# Patient Record
Sex: FEMALE | Race: WHITE | NOT HISPANIC OR LATINO | Employment: UNEMPLOYED | URBAN - METROPOLITAN AREA
[De-identification: names, ages, dates, MRNs, and addresses within clinical notes are randomized per-mention and may not be internally consistent; named-entity substitution may affect disease eponyms.]

---

## 2017-09-05 ENCOUNTER — LAB REQUISITION (OUTPATIENT)
Dept: LAB | Facility: HOSPITAL | Age: 47
End: 2017-09-05

## 2017-09-05 DIAGNOSIS — R13.10 DYSPHAGIA: ICD-10-CM

## 2017-09-05 PROCEDURE — 88305 TISSUE EXAM BY PATHOLOGIST: CPT | Performed by: INTERNAL MEDICINE

## 2017-09-06 LAB
CYTO UR: NORMAL
LAB AP CASE REPORT: NORMAL
LAB AP CLINICAL INFORMATION: NORMAL
Lab: NORMAL
PATH REPORT.FINAL DX SPEC: NORMAL
PATH REPORT.GROSS SPEC: NORMAL

## 2018-04-30 ENCOUNTER — TRANSCRIBE ORDERS (OUTPATIENT)
Dept: ADMINISTRATIVE | Facility: HOSPITAL | Age: 48
End: 2018-04-30

## 2018-04-30 DIAGNOSIS — E03.9 HYPOTHYROIDISM, ADULT: Primary | ICD-10-CM

## 2018-05-02 ENCOUNTER — HOSPITAL ENCOUNTER (OUTPATIENT)
Dept: ULTRASOUND IMAGING | Facility: HOSPITAL | Age: 48
Discharge: HOME | End: 2018-05-02
Admitting: FAMILY MEDICINE

## 2018-05-02 DIAGNOSIS — E03.9 HYPOTHYROIDISM, ADULT: ICD-10-CM

## 2018-05-02 PROCEDURE — 76536 US EXAM OF HEAD AND NECK: CPT

## 2019-03-05 ENCOUNTER — TELEPHONE (OUTPATIENT)
Dept: SURGERY | Facility: CLINIC | Age: 49
End: 2019-03-05

## 2019-03-07 ENCOUNTER — TRANSCRIBE ORDERS (OUTPATIENT)
Dept: MAMMOGRAPHY | Facility: HOSPITAL | Age: 49
End: 2019-03-07

## 2019-03-07 DIAGNOSIS — Z12.39 BREAST CANCER SCREENING: Primary | ICD-10-CM

## 2019-03-07 RX ORDER — BISACODYL 5 MG/1
5 TABLET, DELAYED RELEASE ORAL DAILY PRN
Qty: 4 TABLET | Refills: 0 | Status: SHIPPED | OUTPATIENT
Start: 2019-03-07 | End: 2019-03-25 | Stop reason: HOSPADM

## 2019-03-07 RX ORDER — POLYETHYLENE GLYCOL 3350 17 G/17G
238 POWDER, FOR SOLUTION ORAL ONCE
Qty: 14 PACKET | Refills: 0 | Status: SHIPPED | OUTPATIENT
Start: 2019-03-07 | End: 2019-03-07

## 2019-03-22 ENCOUNTER — PREP FOR SURGERY (OUTPATIENT)
Dept: OTHER | Facility: HOSPITAL | Age: 49
End: 2019-03-22

## 2019-03-22 DIAGNOSIS — Z12.11 COLON CANCER SCREENING: Primary | ICD-10-CM

## 2019-03-22 RX ORDER — SODIUM CHLORIDE, SODIUM LACTATE, POTASSIUM CHLORIDE, CALCIUM CHLORIDE 600; 310; 30; 20 MG/100ML; MG/100ML; MG/100ML; MG/100ML
50 INJECTION, SOLUTION INTRAVENOUS CONTINUOUS
Status: CANCELLED | OUTPATIENT
Start: 2019-03-25

## 2019-03-22 RX ORDER — AMOXICILLIN AND CLAVULANATE POTASSIUM 500; 125 MG/1; MG/1
1 TABLET, FILM COATED ORAL 3 TIMES DAILY
COMMUNITY
Start: 2019-03-22 | End: 2019-10-09

## 2019-03-22 RX ORDER — ACETAMINOPHEN,DIPHENHYDRAMINE HCL 500; 25 MG/1; MG/1
2 TABLET, FILM COATED ORAL NIGHTLY
COMMUNITY
End: 2020-04-08

## 2019-03-22 RX ORDER — SODIUM CHLORIDE 0.9 % (FLUSH) 0.9 %
3 SYRINGE (ML) INJECTION EVERY 12 HOURS SCHEDULED
Status: CANCELLED | OUTPATIENT
Start: 2019-03-25

## 2019-03-22 RX ORDER — VENLAFAXINE HYDROCHLORIDE 150 MG/1
150 CAPSULE, EXTENDED RELEASE ORAL DAILY
COMMUNITY
End: 2019-10-09

## 2019-03-22 RX ORDER — ALBUTEROL SULFATE 90 UG/1
2 AEROSOL, METERED RESPIRATORY (INHALATION) EVERY 4 HOURS PRN
COMMUNITY
End: 2019-11-19 | Stop reason: SDUPTHER

## 2019-03-22 RX ORDER — SODIUM CHLORIDE 0.9 % (FLUSH) 0.9 %
3-10 SYRINGE (ML) INJECTION AS NEEDED
Status: CANCELLED | OUTPATIENT
Start: 2019-03-25

## 2019-03-22 RX ORDER — CIPROFLOXACIN AND DEXAMETHASONE 3; 1 MG/ML; MG/ML
4 SUSPENSION/ DROPS AURICULAR (OTIC) 2 TIMES DAILY
COMMUNITY
Start: 2019-03-22 | End: 2019-10-09

## 2019-03-25 ENCOUNTER — TRANSCRIBE ORDERS (OUTPATIENT)
Dept: CARDIOLOGY | Facility: HOSPITAL | Age: 49
End: 2019-03-25

## 2019-03-25 ENCOUNTER — ANESTHESIA EVENT (OUTPATIENT)
Dept: GASTROENTEROLOGY | Facility: HOSPITAL | Age: 49
End: 2019-03-25

## 2019-03-25 ENCOUNTER — HOSPITAL ENCOUNTER (OUTPATIENT)
Facility: HOSPITAL | Age: 49
Setting detail: HOSPITAL OUTPATIENT SURGERY
Discharge: HOME | End: 2019-03-25
Attending: SURGERY | Admitting: SURGERY

## 2019-03-25 ENCOUNTER — ANESTHESIA (OUTPATIENT)
Dept: GASTROENTEROLOGY | Facility: HOSPITAL | Age: 49
End: 2019-03-25

## 2019-03-25 VITALS
HEIGHT: 63 IN | TEMPERATURE: 97.3 F | RESPIRATION RATE: 16 BRPM | HEART RATE: 77 BPM | BODY MASS INDEX: 31.01 KG/M2 | OXYGEN SATURATION: 97 % | DIASTOLIC BLOOD PRESSURE: 69 MMHG | WEIGHT: 175 LBS | SYSTOLIC BLOOD PRESSURE: 126 MMHG

## 2019-03-25 DIAGNOSIS — R06.09 DYSPNEA ON EXERTION: Primary | ICD-10-CM

## 2019-03-25 DIAGNOSIS — Z12.11 COLON CANCER SCREENING: ICD-10-CM

## 2019-03-25 PROCEDURE — 25010000002 PROPOFOL 200 MG/20ML EMULSION: Performed by: NURSE ANESTHETIST, CERTIFIED REGISTERED

## 2019-03-25 PROCEDURE — S0260 H&P FOR SURGERY: HCPCS | Performed by: SURGERY

## 2019-03-25 PROCEDURE — G0121 COLON CA SCRN NOT HI RSK IND: HCPCS | Performed by: SURGERY

## 2019-03-25 RX ORDER — SODIUM CHLORIDE 0.9 % (FLUSH) 0.9 %
3 SYRINGE (ML) INJECTION EVERY 12 HOURS SCHEDULED
Status: DISCONTINUED | OUTPATIENT
Start: 2019-03-25 | End: 2019-03-25 | Stop reason: HOSPADM

## 2019-03-25 RX ORDER — MAGNESIUM HYDROXIDE 1200 MG/15ML
LIQUID ORAL AS NEEDED
Status: DISCONTINUED | OUTPATIENT
Start: 2019-03-25 | End: 2019-03-25 | Stop reason: HOSPADM

## 2019-03-25 RX ORDER — SODIUM CHLORIDE 0.9 % (FLUSH) 0.9 %
3-10 SYRINGE (ML) INJECTION AS NEEDED
Status: DISCONTINUED | OUTPATIENT
Start: 2019-03-25 | End: 2019-03-25 | Stop reason: HOSPADM

## 2019-03-25 RX ORDER — SODIUM CHLORIDE, SODIUM LACTATE, POTASSIUM CHLORIDE, CALCIUM CHLORIDE 600; 310; 30; 20 MG/100ML; MG/100ML; MG/100ML; MG/100ML
50 INJECTION, SOLUTION INTRAVENOUS CONTINUOUS
Status: DISCONTINUED | OUTPATIENT
Start: 2019-03-25 | End: 2019-03-25 | Stop reason: HOSPADM

## 2019-03-25 RX ORDER — PROPOFOL 10 MG/ML
INJECTION, EMULSION INTRAVENOUS AS NEEDED
Status: DISCONTINUED | OUTPATIENT
Start: 2019-03-25 | End: 2019-03-25 | Stop reason: SURG

## 2019-03-25 RX ADMIN — PROPOFOL 50 MG: 10 INJECTION, EMULSION INTRAVENOUS at 12:55

## 2019-03-25 RX ADMIN — PROPOFOL 50 MG: 10 INJECTION, EMULSION INTRAVENOUS at 12:50

## 2019-03-25 RX ADMIN — PROPOFOL 50 MG: 10 INJECTION, EMULSION INTRAVENOUS at 12:34

## 2019-03-25 RX ADMIN — PROPOFOL 50 MG: 10 INJECTION, EMULSION INTRAVENOUS at 12:45

## 2019-03-25 RX ADMIN — PROPOFOL 50 MG: 10 INJECTION, EMULSION INTRAVENOUS at 12:36

## 2019-03-25 RX ADMIN — SODIUM CHLORIDE, POTASSIUM CHLORIDE, SODIUM LACTATE AND CALCIUM CHLORIDE 50 ML/HR: 600; 310; 30; 20 INJECTION, SOLUTION INTRAVENOUS at 11:42

## 2019-03-25 RX ADMIN — PROPOFOL 50 MG: 10 INJECTION, EMULSION INTRAVENOUS at 12:33

## 2019-03-25 RX ADMIN — LIDOCAINE HYDROCHLORIDE 60 MG: 20 INJECTION, SOLUTION INTRAVENOUS at 12:31

## 2019-03-25 RX ADMIN — PROPOFOL 50 MG: 10 INJECTION, EMULSION INTRAVENOUS at 12:38

## 2019-03-25 NOTE — ANESTHESIA POSTPROCEDURE EVALUATION
Patient: Mary Salgado    Procedure Summary     Date:  03/25/19 Room / Location:  [facility] ENDOSCOPY 3 / [facility] ENDOSCOPY    Anesthesia Start:  1230 Anesthesia Stop:  1302    Procedure:  COLONOSCOPY (N/A ) Diagnosis:       Colon cancer screening      (Colon cancer screening [Z12.11])    Surgeon:  Renata Puentes MD Provider:  Ole Shell CRNA    Anesthesia Type:  MAC ASA Status:  2          Anesthesia Type: MAC  Last vitals  BP   126/69 (03/25/19 1337)   Temp   97.3 °F (36.3 °C) (03/25/19 1307)   Pulse   77 (03/25/19 1337)   Resp   16 (03/25/19 1337)     SpO2   97 % (03/25/19 1337)     Post Anesthesia Care and Evaluation    Patient location during evaluation: bedside  Patient participation: complete - patient participated  Level of consciousness: awake  Pain score: 0  Pain management: adequate  Airway patency: patent  Anesthetic complications: No anesthetic complications  PONV Status: controlled  Cardiovascular status: acceptable and stable  Respiratory status: acceptable and room air  Hydration status: acceptable

## 2019-03-25 NOTE — ANESTHESIA PREPROCEDURE EVALUATION
Anesthesia Evaluation     Patient summary reviewed and Nursing notes reviewed   no history of anesthetic complications:  NPO Solid Status: > 8 hours  NPO Liquid Status: > 8 hours           Airway   Mallampati: II  TM distance: >3 FB  Neck ROM: full  no difficulty expected  Dental - normal exam     Pulmonary - normal exam   (+) a smoker Current Abstained day of surgery, asthma,   Cardiovascular - normal exam    Rhythm: regular  Rate: normal    (+) valvular problems/murmurs murmur, hyperlipidemia,       Neuro/Psych  (+) seizures well controlled, psychiatric history Anxiety and Depression,     GI/Hepatic/Renal/Endo    (+)  GERD well controlled,      Musculoskeletal     (+) arthralgias,   Abdominal    Substance History - negative use     OB/GYN negative ob/gyn ROS         Other   (+) arthritis                     Anesthesia Plan    ASA 2     MAC   (Pt told that intravenous sedation will be used as the primary anesthetic along with local anesthesia if necessary. Every effort will be made to make sure the patient is comfortable.     The patient was told they may or may not have recall for the procedure. It was further explained that if the MAC was not adequate that a general anesthetic with either an LMA or endotracheal tube would be required.     Will proceed with the plan of care.)  intravenous induction   Anesthetic plan, all risks, benefits, and alternatives have been provided, discussed and informed consent has been obtained with: patient.

## 2019-04-05 ENCOUNTER — HOSPITAL ENCOUNTER (OUTPATIENT)
Dept: PULMONOLOGY | Facility: HOSPITAL | Age: 49
Discharge: HOME | End: 2019-04-05
Admitting: FAMILY MEDICINE

## 2019-04-05 DIAGNOSIS — R06.09 DYSPNEA ON EXERTION: ICD-10-CM

## 2019-04-05 PROCEDURE — 94010 BREATHING CAPACITY TEST: CPT

## 2019-04-23 ENCOUNTER — HOSPITAL ENCOUNTER (OUTPATIENT)
Dept: MAMMOGRAPHY | Facility: HOSPITAL | Age: 49
Discharge: HOME | End: 2019-04-23
Admitting: FAMILY MEDICINE

## 2019-04-23 DIAGNOSIS — Z12.39 BREAST CANCER SCREENING: ICD-10-CM

## 2019-04-23 PROCEDURE — 77063 BREAST TOMOSYNTHESIS BI: CPT

## 2019-04-23 PROCEDURE — 77067 SCR MAMMO BI INCL CAD: CPT

## 2019-05-20 ENCOUNTER — OFFICE VISIT (OUTPATIENT)
Dept: PULMONOLOGY | Facility: CLINIC | Age: 49
End: 2019-05-20

## 2019-05-20 ENCOUNTER — LAB (OUTPATIENT)
Dept: LAB | Facility: HOSPITAL | Age: 49
End: 2019-05-20

## 2019-05-20 VITALS
DIASTOLIC BLOOD PRESSURE: 80 MMHG | HEART RATE: 80 BPM | HEIGHT: 63 IN | SYSTOLIC BLOOD PRESSURE: 118 MMHG | WEIGHT: 171 LBS | RESPIRATION RATE: 18 BRPM | BODY MASS INDEX: 30.3 KG/M2 | OXYGEN SATURATION: 97 %

## 2019-05-20 DIAGNOSIS — J45.40 MODERATE PERSISTENT ASTHMA WITHOUT COMPLICATION: ICD-10-CM

## 2019-05-20 DIAGNOSIS — R06.02 SHORTNESS OF BREATH: Primary | ICD-10-CM

## 2019-05-20 DIAGNOSIS — R06.02 SHORTNESS OF BREATH: ICD-10-CM

## 2019-05-20 DIAGNOSIS — J30.89 OTHER ALLERGIC RHINITIS: ICD-10-CM

## 2019-05-20 DIAGNOSIS — F17.200 SMOKING: ICD-10-CM

## 2019-05-20 DIAGNOSIS — R06.02 SOB (SHORTNESS OF BREATH): Primary | ICD-10-CM

## 2019-05-20 LAB
BASOPHILS # BLD AUTO: 0.02 10*3/MM3 (ref 0–0.2)
BASOPHILS NFR BLD AUTO: 0.3 % (ref 0–1.5)
DEPRECATED RDW RBC AUTO: 43.6 FL (ref 37–54)
EOSINOPHIL # BLD AUTO: 0.01 10*3/MM3 (ref 0–0.4)
EOSINOPHIL NFR BLD AUTO: 0.1 % (ref 0.3–6.2)
ERYTHROCYTE [DISTWIDTH] IN BLOOD BY AUTOMATED COUNT: 13.5 % (ref 12.3–15.4)
HCT VFR BLD AUTO: 39.4 % (ref 34–46.6)
HGB BLD-MCNC: 13.6 G/DL (ref 12–15.9)
IMM GRANULOCYTES # BLD AUTO: 0.01 10*3/MM3 (ref 0–0.05)
IMM GRANULOCYTES NFR BLD AUTO: 0.1 % (ref 0–0.5)
LYMPHOCYTES # BLD AUTO: 2.76 10*3/MM3 (ref 0.7–3.1)
LYMPHOCYTES NFR BLD AUTO: 39.9 % (ref 19.6–45.3)
MCH RBC QN AUTO: 30.6 PG (ref 26.6–33)
MCHC RBC AUTO-ENTMCNC: 34.5 G/DL (ref 31.5–35.7)
MCV RBC AUTO: 88.5 FL (ref 79–97)
MONOCYTES # BLD AUTO: 0.38 10*3/MM3 (ref 0.1–0.9)
MONOCYTES NFR BLD AUTO: 5.5 % (ref 5–12)
NEUTROPHILS # BLD AUTO: 3.73 10*3/MM3 (ref 1.7–7)
NEUTROPHILS NFR BLD AUTO: 54.1 % (ref 42.7–76)
NRBC BLD AUTO-RTO: 0 /100 WBC (ref 0–0.2)
PLATELET # BLD AUTO: 164 10*3/MM3 (ref 140–450)
PMV BLD AUTO: 9.7 FL (ref 6–12)
RBC # BLD AUTO: 4.45 10*6/MM3 (ref 3.77–5.28)
WBC NRBC COR # BLD: 6.91 10*3/MM3 (ref 3.4–10.8)

## 2019-05-20 PROCEDURE — 86003 ALLG SPEC IGE CRUDE XTRC EA: CPT

## 2019-05-20 PROCEDURE — 85025 COMPLETE CBC W/AUTO DIFF WBC: CPT

## 2019-05-20 PROCEDURE — 99205 OFFICE O/P NEW HI 60 MIN: CPT | Performed by: INTERNAL MEDICINE

## 2019-05-20 PROCEDURE — 82785 ASSAY OF IGE: CPT

## 2019-05-20 PROCEDURE — 94726 PLETHYSMOGRAPHY LUNG VOLUMES: CPT | Performed by: INTERNAL MEDICINE

## 2019-05-20 PROCEDURE — 95012 NITRIC OXIDE EXP GAS DETER: CPT | Performed by: INTERNAL MEDICINE

## 2019-05-20 PROCEDURE — 94729 DIFFUSING CAPACITY: CPT | Performed by: INTERNAL MEDICINE

## 2019-05-20 PROCEDURE — 94060 EVALUATION OF WHEEZING: CPT | Performed by: INTERNAL MEDICINE

## 2019-05-20 PROCEDURE — 36415 COLL VENOUS BLD VENIPUNCTURE: CPT

## 2019-05-20 RX ORDER — BACLOFEN 10 MG/1
TABLET ORAL EVERY 12 HOURS SCHEDULED
COMMUNITY
End: 2019-10-09

## 2019-05-20 RX ORDER — EPINEPHRINE 0.3 MG/.3ML
INJECTION SUBCUTANEOUS
COMMUNITY
Start: 2018-04-27

## 2019-05-20 RX ORDER — FLUOXETINE HYDROCHLORIDE 20 MG/1
20 CAPSULE ORAL DAILY
COMMUNITY

## 2019-05-20 RX ORDER — ROSUVASTATIN CALCIUM 10 MG/1
10 TABLET, COATED ORAL DAILY
Refills: 5 | COMMUNITY
Start: 2019-05-13

## 2019-05-20 RX ORDER — SUMATRIPTAN 50 MG/1
TABLET, FILM COATED ORAL
COMMUNITY
Start: 2018-04-27 | End: 2019-10-09

## 2019-05-20 RX ORDER — TRAZODONE HYDROCHLORIDE 150 MG/1
150 TABLET ORAL NIGHTLY
Refills: 2 | COMMUNITY
Start: 2019-04-23

## 2019-05-20 RX ORDER — MELOXICAM 15 MG/1
TABLET ORAL EVERY 24 HOURS
COMMUNITY
End: 2019-10-09

## 2019-05-20 RX ORDER — PREDNISONE 10 MG/1
10 TABLET ORAL DAILY PRN
COMMUNITY

## 2019-05-20 RX ORDER — FLUTICASONE PROPIONATE 50 MCG
1 SPRAY, SUSPENSION (ML) NASAL DAILY
Qty: 1 BOTTLE | Refills: 5 | Status: SHIPPED | OUTPATIENT
Start: 2019-05-20 | End: 2019-11-19 | Stop reason: SDUPTHER

## 2019-05-20 RX ORDER — MONTELUKAST SODIUM 10 MG/1
10 TABLET ORAL NIGHTLY
Qty: 30 TABLET | Refills: 5 | Status: SHIPPED | OUTPATIENT
Start: 2019-05-20 | End: 2019-11-19 | Stop reason: SDUPTHER

## 2019-05-20 RX ORDER — DIVALPROEX SODIUM 250 MG/1
TABLET, DELAYED RELEASE ORAL EVERY 12 HOURS SCHEDULED
COMMUNITY
End: 2019-10-09

## 2019-05-20 RX ORDER — PROPRANOLOL HYDROCHLORIDE 60 MG/1
TABLET ORAL EVERY 24 HOURS
COMMUNITY
End: 2019-10-09

## 2019-05-20 RX ORDER — ESOMEPRAZOLE MAGNESIUM 40 MG/1
CAPSULE, DELAYED RELEASE ORAL EVERY 24 HOURS
COMMUNITY
Start: 2018-05-23 | End: 2019-10-09

## 2019-05-20 NOTE — PROGRESS NOTES
CONSULT NOTE    Requested by:   Sebastian Butterfield MD      Chief Complaint   Patient presents with   • Consult   • Breathing Problem       Subjective:  Mary Salgado is a 49 y.o. female.     History of Present Illness   Patient comes in today for consultation because of shortness of breath for the past few years. Patient has had decreased exercise capacity that has been progressive for the past few years. Patient also notes having progressive worsening in his ability to walk up the hill or walk up a flight of stairs.     It is also rarely associated with wheezing. Patient says that she occasionally has a cough that usually follow activity or when exposed to strong smells.    The patient has a dog at home.     The patient does have a family history of asthma, in her mother. She reports known personal history of asthma.    The patient has a history of smoking. She used to smoke 1 PPD for 37 years, says she quit 19 days ago.    Patient also complains of runny nose and dribbling in the back of the throat for the past few months. This has been sometimes associated with seasonal variation.    She uses her rescue inhaler, 4-5 times per day.     The following portions of the patient's history were reviewed and updated as appropriate: allergies, current medications, past family history, past medical history, past social history and past surgical history.    Review of Systems   HENT: Negative for sinus pressure, sneezing and sore throat.    Respiratory: Positive for cough and shortness of breath. Negative for chest tightness and wheezing.    Cardiovascular: Positive for palpitations and leg swelling.   Psychiatric/Behavioral: Positive for sleep disturbance.   All other systems reviewed and are negative.      Past Medical History:   Diagnosis Date   • Anxiety    • Arthritis     RA and OA   • Asthma    • Body piercing     ears, nose   • Diarrhea    • Disease of thyroid gland     Reports she has taken medication for this in  "the past   • Elevated cholesterol     On no medication    • Epilepsy (CMS/[facility]) 2019    Reports no seizure activity in 20+ years   • Fibromyalgia    • GERD (gastroesophageal reflux disease)    • Hearing loss     Patient reports more noted in the right side but has loss in bilateral ears.  No use of hearing aids.   • History of bronchitis    • History of fracture     History of right wrist, right leg - reports no surgical intervention was required   • History of nuclear stress test     Patient reports all was wnl's at that time   • History of pneumonia    • Latex allergy    • Murmur, heart     Reports as a child    • Osteoporosis    • PONV (postoperative nausea and vomiting)    • Tattoos    • Wears glasses    • Wears partial dentures     full upper plate - instructed no adhesives the DOS       Social History     Tobacco Use   • Smoking status: Former Smoker     Packs/day: 1.00     Years: 37.00     Pack years: 37.00     Types: Cigarettes, Electronic Cigarette     Last attempt to quit: 2019     Years since quittin.0   • Smokeless tobacco: Never Used   • Tobacco comment: Reports she has started also using vapor in an attempt to quit smoking   Substance Use Topics   • Alcohol use: No     Frequency: Never         Objective:  Visit Vitals  /80   Pulse 80   Resp 18   Ht 158.8 cm (62.52\")   Wt 77.6 kg (171 lb)   LMP  (LMP Unknown)   SpO2 97%   BMI 30.76 kg/m²       Physical Exam   Constitutional: She is oriented to person, place, and time. She appears well-developed.   HENT:   Head: Normocephalic and atraumatic.   Edentulous in the upper jaw.    Eyes: EOM are normal.   Neck: Neck supple. No JVD present.   Cardiovascular: Normal rate and regular rhythm.   Pulmonary/Chest: Effort normal. She has wheezes. She has no rales.   Somewhat hyperresonant to percussion.  Decreased Air Entry Bilaterally.   Musculoskeletal:   Gait was normal.   Neurological: She is alert and oriented to person, place, and time. "   Skin: Skin is warm and dry.   Psychiatric: She has a normal mood and affect. Her behavior is normal.   Vitals reviewed.      Assessment/Plan:  Mary was seen today for consult and breathing problem.    Diagnoses and all orders for this visit:    Shortness of breath  -     Pulmonary Function Test  -     Nitric Oxide  -     Peak Flow  -     CBC Auto Differential; Future    Moderate persistent asthma without complication  -     Pulmonary Function Test  -     Nitric Oxide  -     Peak Flow  -     CBC Auto Differential; Future    Other allergic rhinitis  -     IgE; Future  -     Allergens, Zone 8; Future    Smoking    Other orders  -     fluticasone (FLONASE) 50 MCG/ACT nasal spray; 1 spray into the nostril(s) as directed by provider Daily. Administer 1 spray in each nostril for each dose.  -     montelukast (SINGULAIR) 10 MG tablet; Take 1 tablet by mouth Every Night.  -     Fluticasone Furoate-Vilanterol (BREO ELLIPTA) 200-25 MCG/INH inhaler; Inhale 1 puff Daily. Rinse mouth with water after use.  -     Tiotropium Bromide Monohydrate (SPIRIVA RESPIMAT) 1.25 MCG/ACT aerosol solution inhaler; Inhale 2 puffs Daily.        Return in about 3 months (around 8/20/2019) for Recheck, Labs, For Margarette.    DISCUSSION(if any):  FeNO level was 18 today.    Peak flow was 370 LPM today.    PFTs were reviewed. Mild obstruction with normal DLCO.     Reviewed the patient's primary care provider's office last note mentions shortness of breath with exertion for 10 years, associated with wheezing and the fact that she was a positive smoker.  It also lists Ventolin, along with a prescription of Breo.    ===========================  ===========================    I had a detailed discussion with the patient regarding her symptoms that are very suggestive of asthma.    Patient will be started on nasal spray for symptoms which are definitely consistent with allergic rhinitis.     I have ordered IgE/RAST panel.    Orders as above.    The  patient was started on Breo at 200-25 mcg, with instructions to rinse her mouth with water after use.     Spiriva will also be started, since her symptoms do suggest moderate to severe asthma.    The patient will be started on Singulair.    Other contributing factors may also need to be treated and this will be discussed with the patient, if and when applicable    Patient was advised to use rescue inhaler for when necessary purposes    Patient was also advised to keep a log of the use of rescue inhaler.    Patient was given reading material.    She was advised to stay quit from smoking.       Dictated utilizing Dragon dictation.    This document was electronically signed by Serina Jang MD on 05/20/19 at 2:13 PM

## 2019-05-28 LAB
A ALTERNATA IGE QN: <0.1 KU/L
A FUMIGATUS IGE QN: <0.1 KU/L
AMER ROACH IGE QN: <0.1 KU/L
BAHIA GRASS IGE QN: <0.1 KU/L
BERMUDA GRASS IGE QN: <0.1 KU/L
BOXELDER IGE QN: <0.1 KU/L
C HERBARUM IGE QN: <0.1 KU/L
CAT DANDER IGG QN: <0.1 KU/L
CMN PIGWEED IGE QN: <0.1 KU/L
COMMON RAGWEED IGE QN: <0.1 KU/L
CONV CLASS DESCRIPTION: NORMAL
D FARINAE IGE QN: <0.1 KU/L
D PTERONYSS IGE QN: <0.1 KU/L
DOG DANDER IGE QN: <0.1 KU/L
ENGL PLANTAIN IGE QN: <0.1 KU/L
HAZELNUT POLN IGE QN: <0.1 KU/L
JOHNSON GRASS IGE QN: <0.1 KU/L
KENT BLUE GRASS IGE QN: <0.1 KU/L
M RACEMOSUS IGE QN: <0.1 KU/L
MT JUNIPER IGE QN: <0.1 KU/L
MUGWORT IGE QN: <0.1 KU/L
NETTLE IGE QN: <0.1 KU/L
P NOTATUM IGE QN: <0.1 KU/L
S BOTRYOSUM IGE QN: <0.1 KU/L
SHEEP SORREL IGE QN: <0.1 KU/L
SWEET GUM IGE QN: <0.1 KU/L
T011-IGE MAPLE LEAF SYCAMORE: <0.1 KU/L
TOTAL IGE SMQN RAST: 29 IU/ML (ref 6–495)
WHITE ELM IGE QN: <0.1 KU/L
WHITE HICKORY IGE QN: <0.1 KU/L
WHITE MULBERRY IGE QN: <0.1 KU/L
WHITE OAK IGE QN: <0.1 KU/L

## 2019-10-09 ENCOUNTER — OFFICE VISIT (OUTPATIENT)
Dept: ENDOCRINOLOGY | Facility: CLINIC | Age: 49
End: 2019-10-09

## 2019-10-09 VITALS
SYSTOLIC BLOOD PRESSURE: 122 MMHG | DIASTOLIC BLOOD PRESSURE: 72 MMHG | OXYGEN SATURATION: 97 % | BODY MASS INDEX: 30.44 KG/M2 | WEIGHT: 171.8 LBS | HEART RATE: 85 BPM | HEIGHT: 63 IN

## 2019-10-09 DIAGNOSIS — E06.3 HYPOTHYROIDISM DUE TO HASHIMOTO'S THYROIDITIS: Primary | ICD-10-CM

## 2019-10-09 DIAGNOSIS — R93.89 ABNORMAL THYROID ULTRASOUND: ICD-10-CM

## 2019-10-09 DIAGNOSIS — E03.8 HYPOTHYROIDISM DUE TO HASHIMOTO'S THYROIDITIS: Primary | ICD-10-CM

## 2019-10-09 PROCEDURE — 99204 OFFICE O/P NEW MOD 45 MIN: CPT | Performed by: INTERNAL MEDICINE

## 2019-10-09 PROCEDURE — 90686 IIV4 VACC NO PRSV 0.5 ML IM: CPT | Performed by: INTERNAL MEDICINE

## 2019-10-09 PROCEDURE — 76536 US EXAM OF HEAD AND NECK: CPT | Performed by: INTERNAL MEDICINE

## 2019-10-09 PROCEDURE — G0008 ADMIN INFLUENZA VIRUS VAC: HCPCS | Performed by: INTERNAL MEDICINE

## 2019-10-09 RX ORDER — LAMOTRIGINE 100 MG/1
100 TABLET ORAL 2 TIMES DAILY
COMMUNITY

## 2019-10-09 NOTE — PROGRESS NOTES
Thyroid Ultrasound Report  [facility] Endocrinology  [place], [place]    Date: 10/09/2019   Indication: abnormal thyroid US  Comparison Imaging:  Radiology Thyroid US 05/02/2018  Clinical History: 49 y.o. Female with subclinical hypothyroidism and + TPOAbs. Not on medication currently. Outside US report described multiple thyroid nodules bilaterally.     Real time high resolution imaging of the thyroid gland was performed in transverse and longitudinal planes.  The right lobe measured 4.46 cm in Length x 1.64 cm in AP diameter x 1.87 cm in TV dimension.  The isthmus measured 0.29 cm in thickness.  The left thyroid lobe measured 3.95 cm in length x 1.26 cm in AP diameter x 1.06 cm in TV dimension.    The thyroid gland appeared overall slightly hypoechoic with diffusely heterogenous echotexture, pseudonodules, and fibrous stranding throughout the gland. Increased vascularity was also noted throughout the lobes. These findings are suggestive of Hashimoto's thyroiditis and correlate clinically with the patient's history.     No discrete nodules were identified in either lobe or the isthmus.  No pathologic lymph nodes were seen.     IMPRESSION:   1) Asymmetric thyroid gland that was normal in size by measured dimensions. Right lobe was larger than left lobe.  2) The thyroid gland appeared overall slightly hypoechoic with diffusely heterogenous echotexture, pseudonodules, and fibrous stranding throughout the gland. Increased vascularity was also noted throughout the lobes. These findings are suggestive of Hashimoto's thyroiditis and correlate clinically with the patient's history.   3) No discrete nodules were identified in either lobe or the isthmus.    RECOMMENDATIONS: Repeat Thyroid US recommended if changes in the gland / neck are noted on physical exam.

## 2019-10-09 NOTE — PROGRESS NOTES
"Chief Complaint   Patient presents with   • abnormal thyroid US     NP referred by Dr. Sebastian Butterfield       HPI:   Mary Salgado is a 49 y.o.female referred by Sebastian Butterfield MD for further evaluation of pt's thyroid gland. Her history is as follows:    1) abnormal thyroid US:  - pt had her first thyroid US on 05/02/2018 at [facility]. The US was ordered per medical records for further evaluation of pt's h/o hypothyroidism. The report described \"Findings suggestive of chronic thyroiditis.\" No nodules were described.    - Pt had another Thyroid US in 03/2019 at [facility] after presenting with c/o sore throat per pt. The report from 3/26/2019 described \"bilateral hypoechoic nodules. 6 month follow-up recommended.\" Pt had normal TFTs at that time: (TSH 3.930, free T4 1.07).    2) h/o hypothyroidism:  - pt reports a h/o hypothyroidism first found in approximately 2017  - was on levothyroxine 75 mcg daily per clinic notes from 05/2018.  - the levothyroxine was stopped for unclear reasons in 12/2018 or 01/2019    FMH: no known thyroid cancer, no known thyroid disease  PMH: no h/o irradiation of head, neck, or chest    Other medical history:   - Has RA on Enbrel and prednisone, h/o bipolar disorder     Review of Systems   Constitutional: Positive for appetite change (decreased) and fatigue. Negative for unexpected weight change.   HENT: Positive for hearing loss and sore throat.    Eyes:        Dry eyes   Respiratory: Positive for cough, shortness of breath and wheezing.         Due to asthma   Cardiovascular: Positive for leg swelling (ankles). Negative for chest pain.   Gastrointestinal: Positive for diarrhea and nausea.        Frequent heartburn   Genitourinary: Positive for enuresis and frequency.   Musculoskeletal: Positive for arthralgias, joint swelling and myalgias.        Chronic pain   Skin: Negative.    Neurological: Positive for headaches.   Hematological: Bruises/bleeds easily. "   Psychiatric/Behavioral: Positive for sleep disturbance. Negative for suicidal ideas.        Depression, anxiety     Past Medical History:   Diagnosis Date   • Anxiety    • Arthritis     RA and OA   • Asthma    • Body piercing     ears, nose   • Diarrhea    • Elevated cholesterol     On no medication    • Epilepsy (CMS/[facility]) 03/22/2019    Reports no seizure activity in 20+ years   • Fibromyalgia    • GERD (gastroesophageal reflux disease)    • Hearing loss     Patient reports more noted in the right side but has loss in bilateral ears.  No use of hearing aids.   • History of bronchitis    • History of fracture     History of right wrist, right leg - reports no surgical intervention was required   • History of nuclear stress test 2013    Patient reports all was wnl's at that time   • History of pneumonia    • Hypothyroidism due to Hashimoto's thyroiditis    • Latex allergy    • Murmur, heart     Reports as a child    • Osteoporosis    • PONV (postoperative nausea and vomiting)    • Tattoos    • Wears glasses    • Wears partial dentures     full upper plate - instructed no adhesives the DOS     family history includes Arthritis in her father and mother; Bipolar disorder in her daughter; Breast cancer in her paternal aunt and paternal grandmother; Diabetes in her father and mother; Heart attack in her mother; Hypertension in her mother; Kidney disease in her father; Liver disease in her father; Migraines in her mother; Osteoarthritis in her mother.  Past Surgical History:   Procedure Laterality Date   • ABDOMINAL SURGERY      diagnostic laparoscopy x2   • APPENDECTOMY     • CATARACT EXTRACTION Bilateral     cataract extractions   • COLONOSCOPY N/A 3/25/2019    Procedure: COLONOSCOPY;  Surgeon: Renata Puentes MD;  Location: [facility] ENDOSCOPY;  Service: Gastroenterology   • HYSTERECTOMY     • LAPAROSCOPIC CHOLECYSTECTOMY     • MULTIPLE TOOTH EXTRACTIONS     • OOPHORECTOMY     • TONSILLECTOMY       Social History      Tobacco Use   • Smoking status: Former Smoker     Packs/day: 1.00     Years: 37.00     Pack years: 37.00     Types: Cigarettes, Electronic Cigarette     Last attempt to quit: 2019     Years since quittin.5   • Smokeless tobacco: Never Used   • Tobacco comment: Reports she has started also using vapor in an attempt to quit smoking   Substance Use Topics   • Alcohol use: No     Frequency: Never   • Drug use: No     Outpatient Medications Prior to Visit   Medication Sig Dispense Refill   • albuterol sulfate  (90 Base) MCG/ACT inhaler Inhale 2 puffs Every 4 (Four) Hours As Needed for Wheezing.     • diphenhydrAMINE-acetaminophen (TYLENOL PM)  MG tablet per tablet Take 2 tablets by mouth Every Night.     • EPINEPHrine (EPIPEN 2-SAILAJA) 0.3 MG/0.3ML solution auto-injector injection Infuse  into a venous catheter.     • etanercept (ENBREL) 50 MG/ML solution prefilled syringe injection Inject 50 mg under the skin into the appropriate area as directed 1 (One) Time Per Week. Reports this injection is take every Wednesday     • FLUoxetine (PROZAC) 20 MG capsule Take 20 mg by mouth Daily.     • fluticasone (FLONASE) 50 MCG/ACT nasal spray 1 spray into the nostril(s) as directed by provider Daily. Administer 1 spray in each nostril for each dose. 1 bottle 5   • lamoTRIgine (LaMICtal) 100 MG tablet Take 100 mg by mouth 2 (Two) Times a Day.     • montelukast (SINGULAIR) 10 MG tablet Take 1 tablet by mouth Every Night. 30 tablet 5   • predniSONE (DELTASONE) 10 MG tablet Take 10 mg by mouth Daily.     • rosuvastatin (CRESTOR) 10 MG tablet Take 10 mg by mouth Daily.  5   • Tiotropium Bromide Monohydrate (SPIRIVA RESPIMAT) 1.25 MCG/ACT aerosol solution inhaler Inhale 2 puffs Daily. 1 inhaler 5   • traZODone (DESYREL) 150 MG tablet Take 150 mg by mouth Every Night.  2   • Fluticasone Furoate-Vilanterol (BREO ELLIPTA) 200-25 MCG/INH inhaler Inhale 1 puff Daily. Rinse mouth with water after use. 1 each 5   • BREO  "ELLIPTA 100-25 MCG/INH inhaler      • hydrOXYzine pamoate (VISTARIL) 50 MG capsule      • leflunomide (ARAVA) 10 MG tablet Take 10 mg by mouth Daily.  2   • amoxicillin-clavulanate (AUGMENTIN) 500-125 MG per tablet Take 1 tablet by mouth 3 (Three) Times a Day.     • baclofen (LIORESAL) 10 MG tablet Take  by mouth Every 12 (Twelve) Hours.     • ciprofloxacin-dexamethasone (CIPRODEX) 0.3-0.1 % otic suspension Administer 4 drops into both ears 2 (Two) Times a Day.     • divalproex (DEPAKOTE) 250 MG DR tablet Take  by mouth Every 12 (Twelve) Hours.     • esomeprazole (NEXIUM) 40 MG capsule Take  by mouth Daily.     • lamoTRIgine (LaMICtal) 25 MG tablet      • meloxicam (MOBIC) 15 MG tablet Take  by mouth Daily.     • propranolol (INDERAL) 60 MG tablet Take  by mouth Daily.     • SUMAtriptan (IMITREX) 50 MG tablet Take  by mouth.     • venlafaxine XR (EFFEXOR-XR) 150 MG 24 hr capsule Take 150 mg by mouth Daily.       No facility-administered medications prior to visit.      Allergies   Allergen Reactions   • Bee Venom Anaphylaxis   • Demerol [Meperidine] Shortness Of Breath and Itching   • Diclofenac Other (See Comments)     Reports \"it make me suicidal\"   • Flagyl [Metronidazole] Shortness Of Breath and Itching   • Latex Shortness Of Breath and Rash   • Lyrica [Pregabalin] Other (See Comments)     Reports \"it makes me suicidal\"   • Morphine Shortness Of Breath and Itching   • Saccharide Iron Complex [Iron] Shortness Of Breath     Reports \"saccharin artificial sweetner\"   • Aspirin Nausea And Vomiting   • Baclofen Other (See Comments)     Reports \"heart palpitations\"   • Dicyclomine Nausea And Vomiting       /72   Pulse 85   Ht 160 cm (63\")   Wt 77.9 kg (171 lb 12.8 oz)   LMP  (LMP Unknown)   SpO2 97%   BMI 30.43 kg/m²   Physical Exam   Constitutional: She is oriented to person, place, and time. She appears well-developed. No distress.   HENT:   Head: Normocephalic.   Mouth/Throat: Oropharynx is clear and " moist.   Eyes: Conjunctivae and EOM are normal. Pupils are equal, round, and reactive to light.   Neck: No tracheal deviation present. No thyromegaly present.   No palpable thyroid nodules     Cardiovascular: Normal rate, regular rhythm and normal heart sounds.   No murmur heard.  Pulmonary/Chest: Effort normal and breath sounds normal. No respiratory distress.   Lymphadenopathy:     She has no cervical adenopathy.   Neurological: She is alert and oriented to person, place, and time. No cranial nerve deficit.   Skin: Skin is warm and dry. She is not diaphoretic. No erythema.   Psychiatric: She has a normal mood and affect. Her behavior is normal.   Vitals reviewed.      LABS/IMAGING: outside records reviewed and summarized in [facility]  Results for orders placed or performed in visit on 10/09/19   TSH   Result Value Ref Range    TSH 5.650 (H) 0.270 - 4.200 uIU/mL   Thyroid Peroxidase Antibody   Result Value Ref Range    Thyroid Peroxidase Antibody 234 (H) 0 - 34 IU/mL       PROCEDURES (in office):  Thyroid Ultrasound Report  [facility] Endocrinology  [place], [place]    Date: 10/09/2019   Indication: abnormal thyroid US  Comparison Imaging:  Radiology Thyroid US 05/02/2018  Clinical History: 49 y.o. Female with subclinical hypothyroidism and + TPOAbs. Not on medication currently. Outside US report described multiple thyroid nodules bilaterally.     Real time high resolution imaging of the thyroid gland was performed in transverse and longitudinal planes.  The right lobe measured 4.46 cm in Length x 1.64 cm in AP diameter x 1.87 cm in TV dimension.  The isthmus measured 0.29 cm in thickness.  The left thyroid lobe measured 3.95 cm in length x 1.26 cm in AP diameter x 1.06 cm in TV dimension.    The thyroid gland appeared overall slightly hypoechoic with diffusely heterogenous echotexture, pseudonodules, and fibrous stranding throughout the gland. Increased vascularity was also noted throughout the lobes. These findings  are suggestive of Hashimoto's thyroiditis and correlate clinically with the patient's history.     No discrete nodules were identified in either lobe or the isthmus.  No pathologic lymph nodes were seen.     IMPRESSION:   1) Asymmetric thyroid gland that was normal in size by measured dimensions. Right lobe was larger than left lobe.  2) The thyroid gland appeared overall slightly hypoechoic with diffusely heterogenous echotexture, pseudonodules, and fibrous stranding throughout the gland. Increased vascularity was also noted throughout the lobes. These findings are suggestive of Hashimoto's thyroiditis and correlate clinically with the patient's history.   3) No discrete nodules were identified in either lobe or the isthmus.    RECOMMENDATIONS: Repeat Thyroid US recommended if changes in the gland / neck are noted on physical exam.       ASSESSMENT/PLAN:  1) hypothyroidism due to Hashimoto's thyroiditis:  - labs and imaging today confirm diagnosis. Although her TSH is only mildly elevated, recommended treatment to patient given her other medical problems that may be exacerbated by worsening hypothyroidism.   - Pt is in agreement with this plan  - Will start levothyroxine 50 mcg daily  - Reviewed proper thyroid hormone administration, and factors to avoid that decrease medication potency and medication absorption.   - Will have pt complete a TSH level locally in 3 months. I will adjust the dose as indicated    2) abnormal thyroid US: Thyroid US completed in clinic today showed -   - Asymmetric thyroid gland that was normal in size by measured dimensions. Right lobe was larger than left lobe.  - The thyroid gland appeared overall slightly hypoechoic with diffusely heterogenous echotexture, pseudonodules, and fibrous stranding throughout the gland. Increased vascularity was also noted throughout the lobes. These findings are suggestive of Hashimoto's thyroiditis and correlate clinically with the patient's history.   -  No discrete nodules were identified in either lobe or the isthmus.    RECOMMENDATIONS: Repeat Thyroid US recommended if changes in the gland / neck are noted on physical exam.     Counseling was given to patient for the following topics:  diagnostic results, instructions for management and impressions, see details in assessment/plan. Total face to face time of the encounter was 50 minutes and 40 minutes was spent counseling.

## 2019-10-10 LAB
THYROPEROXIDASE AB SERPL-ACNC: 234 IU/ML (ref 0–34)
TSH SERPL DL<=0.005 MIU/L-ACNC: 5.65 UIU/ML (ref 0.27–4.2)

## 2019-10-31 ENCOUNTER — TELEPHONE (OUTPATIENT)
Dept: INTERNAL MEDICINE | Facility: CLINIC | Age: 49
End: 2019-10-31

## 2019-10-31 PROBLEM — G43.909 MIGRAINE WITHOUT STATUS MIGRAINOSUS, NOT INTRACTABLE: Status: ACTIVE | Noted: 2019-10-31

## 2019-10-31 PROBLEM — F33.9 RECURRENT MAJOR DEPRESSIVE DISORDER (HCC): Status: ACTIVE | Noted: 2019-10-31

## 2019-10-31 PROBLEM — E78.5 HYPERLIPIDEMIA: Status: ACTIVE | Noted: 2019-10-31

## 2019-10-31 PROBLEM — F31.9 BIPOLAR DISORDER, UNSPECIFIED (HCC): Status: ACTIVE | Noted: 2019-10-31

## 2019-10-31 PROBLEM — K21.9 GASTROESOPHAGEAL REFLUX DISEASE: Status: ACTIVE | Noted: 2019-10-31

## 2019-10-31 PROBLEM — G40.909 SEIZURE DISORDER (HCC): Status: ACTIVE | Noted: 2019-10-31

## 2019-10-31 PROBLEM — J45.909 ASTHMA: Status: ACTIVE | Noted: 2019-10-31

## 2019-10-31 PROBLEM — M06.9 RHEUMATOID ARTHRITIS (HCC): Status: ACTIVE | Noted: 2019-10-31

## 2019-10-31 PROBLEM — M79.7 FIBROMYALGIA: Status: ACTIVE | Noted: 2019-10-31

## 2019-10-31 RX ORDER — LEFLUNOMIDE 10 MG/1
10 TABLET ORAL DAILY
Refills: 2 | COMMUNITY
Start: 2019-09-19 | End: 2020-04-08

## 2019-10-31 RX ORDER — LEVOTHYROXINE SODIUM 0.05 MG/1
50 TABLET ORAL NIGHTLY
Qty: 90 TABLET | Refills: 1 | Status: SHIPPED | OUTPATIENT
Start: 2019-10-31 | End: 2019-11-08 | Stop reason: SDUPTHER

## 2019-10-31 RX ORDER — HYDROXYZINE PAMOATE 50 MG/1
CAPSULE ORAL
COMMUNITY
Start: 2019-09-09

## 2019-10-31 RX ORDER — LAMOTRIGINE 25 MG/1
TABLET ORAL
COMMUNITY
Start: 2019-08-12 | End: 2019-10-31 | Stop reason: SDUPTHER

## 2019-10-31 NOTE — TELEPHONE ENCOUNTER
Spoke to patient about lab results. See clinic note from 10/09/2019 for details.   Estella Dixon MD

## 2019-11-05 ENCOUNTER — TELEPHONE (OUTPATIENT)
Dept: INTERNAL MEDICINE | Facility: CLINIC | Age: 49
End: 2019-11-05

## 2019-11-05 NOTE — TELEPHONE ENCOUNTER
Clarification was given to [facility] pharmacy concerning Levothyroxine. Medication is to be taking nightly on a empty stomach per Dr. Dixon .

## 2019-11-08 DIAGNOSIS — E03.8 HYPOTHYROIDISM DUE TO HASHIMOTO'S THYROIDITIS: Primary | ICD-10-CM

## 2019-11-08 DIAGNOSIS — E06.3 HYPOTHYROIDISM DUE TO HASHIMOTO'S THYROIDITIS: Primary | ICD-10-CM

## 2019-11-08 RX ORDER — LEVOTHYROXINE SODIUM 0.05 MG/1
50 TABLET ORAL DAILY
Qty: 90 TABLET | Refills: 1 | Status: SHIPPED | OUTPATIENT
Start: 2019-11-08 | End: 2020-04-08 | Stop reason: SDUPTHER

## 2019-11-19 ENCOUNTER — OFFICE VISIT (OUTPATIENT)
Dept: PULMONOLOGY | Facility: CLINIC | Age: 49
End: 2019-11-19

## 2019-11-19 VITALS
RESPIRATION RATE: 18 BRPM | SYSTOLIC BLOOD PRESSURE: 118 MMHG | HEIGHT: 63 IN | WEIGHT: 182 LBS | DIASTOLIC BLOOD PRESSURE: 70 MMHG | OXYGEN SATURATION: 95 % | BODY MASS INDEX: 32.25 KG/M2 | HEART RATE: 81 BPM

## 2019-11-19 DIAGNOSIS — J30.89 OTHER ALLERGIC RHINITIS: ICD-10-CM

## 2019-11-19 DIAGNOSIS — J45.40 MODERATE PERSISTENT ASTHMA WITHOUT COMPLICATION: ICD-10-CM

## 2019-11-19 DIAGNOSIS — R06.02 SOB (SHORTNESS OF BREATH): Primary | ICD-10-CM

## 2019-11-19 DIAGNOSIS — F17.200 SMOKING: ICD-10-CM

## 2019-11-19 PROCEDURE — 99214 OFFICE O/P EST MOD 30 MIN: CPT | Performed by: NURSE PRACTITIONER

## 2019-11-19 RX ORDER — QUETIAPINE FUMARATE 100 MG/1
100 TABLET, FILM COATED ORAL
Refills: 2 | COMMUNITY
Start: 2019-11-04

## 2019-11-19 RX ORDER — ALBUTEROL SULFATE 90 UG/1
2 AEROSOL, METERED RESPIRATORY (INHALATION) EVERY 4 HOURS PRN
Qty: 3 INHALER | Refills: 1 | Status: SHIPPED | OUTPATIENT
Start: 2019-11-19 | End: 2020-11-13 | Stop reason: SDUPTHER

## 2019-11-19 RX ORDER — FLUTICASONE PROPIONATE 50 MCG
1 SPRAY, SUSPENSION (ML) NASAL DAILY
Qty: 3 BOTTLE | Refills: 1 | Status: SHIPPED | OUTPATIENT
Start: 2019-11-19 | End: 2020-11-13 | Stop reason: SDUPTHER

## 2019-11-19 RX ORDER — MONTELUKAST SODIUM 10 MG/1
10 TABLET ORAL NIGHTLY
Qty: 90 TABLET | Refills: 1 | Status: SHIPPED | OUTPATIENT
Start: 2019-11-19 | End: 2020-11-13 | Stop reason: SDUPTHER

## 2020-01-31 ENCOUNTER — RESULTS ENCOUNTER (OUTPATIENT)
Dept: ENDOCRINOLOGY | Facility: CLINIC | Age: 50
End: 2020-01-31

## 2020-01-31 DIAGNOSIS — E03.8 HYPOTHYROIDISM DUE TO HASHIMOTO'S THYROIDITIS: ICD-10-CM

## 2020-01-31 DIAGNOSIS — E06.3 HYPOTHYROIDISM DUE TO HASHIMOTO'S THYROIDITIS: ICD-10-CM

## 2020-04-08 ENCOUNTER — TELEMEDICINE (OUTPATIENT)
Dept: ENDOCRINOLOGY | Facility: CLINIC | Age: 50
End: 2020-04-08

## 2020-04-08 DIAGNOSIS — E03.8 HYPOTHYROIDISM DUE TO HASHIMOTO'S THYROIDITIS: ICD-10-CM

## 2020-04-08 DIAGNOSIS — E06.3 HYPOTHYROIDISM DUE TO HASHIMOTO'S THYROIDITIS: ICD-10-CM

## 2020-04-08 PROCEDURE — 99213 OFFICE O/P EST LOW 20 MIN: CPT | Performed by: INTERNAL MEDICINE

## 2020-04-08 RX ORDER — LEFLUNOMIDE 20 MG/1
20 TABLET ORAL DAILY
COMMUNITY
Start: 2020-03-19

## 2020-04-08 RX ORDER — LEVOTHYROXINE SODIUM 0.05 MG/1
50 TABLET ORAL DAILY
Qty: 30 TABLET | Refills: 5 | Status: SHIPPED | OUTPATIENT
Start: 2020-04-08

## 2020-04-08 RX ORDER — HYDROCHLOROTHIAZIDE 12.5 MG/1
TABLET ORAL
COMMUNITY
Start: 2020-02-21

## 2020-04-08 RX ORDER — FAMOTIDINE 20 MG/1
TABLET, FILM COATED ORAL
COMMUNITY
Start: 2020-02-21

## 2020-04-08 NOTE — PROGRESS NOTES
"Chief Complaint   Patient presents with   • Hypothyroidism     f/u       HPI:   Mary Salgado is a 50 y.o.female who presents as a telemedicine video visit for follow-up of her hypothyroidism. Last office visit 10/09/2019.  Her history is as follows:    Interim Events:   - no new medical problems.   - Is taking Seroquel 100 mg nightly.  - Pt was unable to complete labs after her last visit.    1) h/o abnormal thyroid US:  - pt had her first thyroid US on 05/02/2018 at [facility]. The US was ordered per medical records for further evaluation of pt's h/o hypothyroidism. The report described \"Findings suggestive of chronic thyroiditis.\" No nodules were described.  - Pt had another Thyroid US in 03/2019 at [facility] after presenting with c/o sore throat per pt. The report from 3/26/2019 described \"bilateral hypoechoic nodules. 6 month follow-up recommended.\" Pt had normal TFTs at that time: (TSH 3.930, free T4 1.07).    Thyroid US (10/09/2019): Thyroid US completed by me in clinic showed an asymmetric thyroid gland that was normal in size by measured dimensions. Right lobe was larger than left lobe.  - The thyroid gland appeared overall slightly hypoechoic with diffusely heterogenous echotexture, pseudonodules, and fibrous stranding throughout the gland. Increased vascularity was also noted throughout the lobes. These findings are suggestive of Hashimoto's thyroiditis and correlate clinically with the patient's history.   - No discrete nodules were identified in either lobe or the isthmus.  RECOMMENDATIONS: Repeat Thyroid US recommended if changes in the gland / neck are noted on physical exam.     2) hypothyroidism due to Hashimoto's thyroiditis:  - pt reported a h/o hypothyroidism first found in approximately 2017  - was on levothyroxine 75 mcg daily per clinic notes from 05/2018.  - the levothyroxine was stopped for unclear reasons in 12/2018 or 01/2019  [facility]: no known thyroid cancer, no known " thyroid disease  PMH: no h/o irradiation of head, neck, or chest    Initial Endocrine Clinic Visit: (10/09/2019)   TSH was 5.650 (0.270 - 4.200). Although her TSH was only mildly elevated, I recommended treatment to patient given her other medical problems that may be exacerbated by worsening hypothyroidism. Pt was in agreement with this plan. Prescribed levothyroxine 50 mcg daily at that time.     Current Dose: Levothyroxine 50 mcg   - Take is AM WITH hot coffee. Does not take with food  - Denies missed doses  - Is not taking high dose biotin     Has not experienced any difference in fatigue or joint pain since starting the levothyroxine    Other medical history:   - Has RA on Enbrel and prednisone, h/o bipolar disorder     Review of Systems   Constitutional: Positive for appetite change (decreased) and fatigue. Negative for unexpected weight change.   HENT: Positive for hearing loss and sore throat.    Eyes:        Dry eyes   Respiratory: Positive for shortness of breath (intermittent). Negative for cough and wheezing.         Due to asthma   Cardiovascular: Positive for leg swelling (ankles). Negative for chest pain.   Gastrointestinal: Negative.  Negative for diarrhea and nausea.        Frequent heartburn   Genitourinary: Positive for enuresis and frequency.   Musculoskeletal: Positive for arthralgias, joint swelling and myalgias.        Chronic pain   Skin: Negative.    Neurological: Positive for headaches.   Hematological: Bruises/bleeds easily.   Psychiatric/Behavioral: Positive for sleep disturbance (improving). Negative for suicidal ideas.        Depression, anxiety     The following portions of the patient's history were reviewed and updated as appropriate: allergies, current medications, past family history, past medical history, past social history, past surgical history and problem list.    Physical Exam   Constitutional: She appears well-developed and well-nourished. No distress.    Telemedicine video  visit    LABS/IMAGING: outside records reviewed and summarized in HPI    TSH - result pending    ASSESSMENT/PLAN:  1) hypothyroidism due to Hashimoto's thyroiditis:  - Pt clinically euthyroid based on ROS  - Pt has been taking her levothyroxine with hot liquids in AM which can effect the medication potency. Reviewed proper thyroid hormone administration, and factors to avoid that decrease medication potency and medication absorption.   - Will mail lab slip for TSH to patient to complete in 4 weeks after properly taking the levothyroxine 50 mcg without interfering factors. Will adjust dose if indicated.  - For now, continue levothyroxine 50 mcg daily until lab results reviewed.    Counseling was given to patient for the following topics:  instructions for management and importance of treatment compliance, see details in assessment/plan. Total face to face time via video of the encounter was 15 minutes and 10 minutes was spent counseling.    RTC 6 months

## 2020-05-06 ENCOUNTER — RESULTS ENCOUNTER (OUTPATIENT)
Dept: ENDOCRINOLOGY | Facility: CLINIC | Age: 50
End: 2020-05-06

## 2020-05-06 DIAGNOSIS — E06.3 HYPOTHYROIDISM DUE TO HASHIMOTO'S THYROIDITIS: ICD-10-CM

## 2020-05-06 DIAGNOSIS — E03.8 HYPOTHYROIDISM DUE TO HASHIMOTO'S THYROIDITIS: ICD-10-CM

## 2020-05-19 ENCOUNTER — APPOINTMENT (OUTPATIENT)
Dept: GENERAL RADIOLOGY | Facility: HOSPITAL | Age: 50
End: 2020-05-19

## 2020-05-19 ENCOUNTER — HOSPITAL ENCOUNTER (EMERGENCY)
Facility: HOSPITAL | Age: 50
Discharge: HOME | End: 2020-05-19
Attending: EMERGENCY MEDICINE | Admitting: EMERGENCY MEDICINE

## 2020-05-19 VITALS
DIASTOLIC BLOOD PRESSURE: 70 MMHG | TEMPERATURE: 98.5 F | RESPIRATION RATE: 18 BRPM | BODY MASS INDEX: 31.36 KG/M2 | HEART RATE: 66 BPM | SYSTOLIC BLOOD PRESSURE: 121 MMHG | HEIGHT: 63 IN | OXYGEN SATURATION: 95 % | WEIGHT: 177 LBS

## 2020-05-19 DIAGNOSIS — R07.9 NONSPECIFIC CHEST PAIN: Primary | ICD-10-CM

## 2020-05-19 LAB
ALBUMIN SERPL-MCNC: 4.6 G/DL (ref 3.5–5.2)
ALBUMIN/GLOB SERPL: 1.6 G/DL
ALP SERPL-CCNC: 77 U/L (ref 39–117)
ALT SERPL W P-5'-P-CCNC: 41 U/L (ref 1–33)
ANION GAP SERPL CALCULATED.3IONS-SCNC: 15.5 MMOL/L (ref 5–15)
AST SERPL-CCNC: 28 U/L (ref 1–32)
BASOPHILS # BLD AUTO: 0.07 10*3/MM3 (ref 0–0.2)
BASOPHILS NFR BLD AUTO: 0.8 % (ref 0–1.5)
BILIRUB SERPL-MCNC: 0.7 MG/DL (ref 0.2–1.2)
BUN BLD-MCNC: 8 MG/DL (ref 6–20)
BUN/CREAT SERPL: 10.1 (ref 7–25)
CALCIUM SPEC-SCNC: 9.2 MG/DL (ref 8.6–10.5)
CHLORIDE SERPL-SCNC: 99 MMOL/L (ref 98–107)
CO2 SERPL-SCNC: 24.5 MMOL/L (ref 22–29)
CREAT BLD-MCNC: 0.79 MG/DL (ref 0.57–1)
DEPRECATED RDW RBC AUTO: 41.8 FL (ref 37–54)
EOSINOPHIL # BLD AUTO: 0.26 10*3/MM3 (ref 0–0.4)
EOSINOPHIL NFR BLD AUTO: 3.1 % (ref 0.3–6.2)
ERYTHROCYTE [DISTWIDTH] IN BLOOD BY AUTOMATED COUNT: 13.4 % (ref 12.3–15.4)
GFR SERPL CREATININE-BSD FRML MDRD: 76 ML/MIN/1.73
GLOBULIN UR ELPH-MCNC: 2.8 GM/DL
GLUCOSE BLD-MCNC: 104 MG/DL (ref 65–99)
HCT VFR BLD AUTO: 41.9 % (ref 34–46.6)
HGB BLD-MCNC: 14.7 G/DL (ref 12–15.9)
HOLD SPECIMEN: NORMAL
HOLD SPECIMEN: NORMAL
IMM GRANULOCYTES # BLD AUTO: 0.02 10*3/MM3 (ref 0–0.05)
IMM GRANULOCYTES NFR BLD AUTO: 0.2 % (ref 0–0.5)
LYMPHOCYTES # BLD AUTO: 3.74 10*3/MM3 (ref 0.7–3.1)
LYMPHOCYTES NFR BLD AUTO: 44 % (ref 19.6–45.3)
MCH RBC QN AUTO: 30.1 PG (ref 26.6–33)
MCHC RBC AUTO-ENTMCNC: 35.1 G/DL (ref 31.5–35.7)
MCV RBC AUTO: 85.9 FL (ref 79–97)
MONOCYTES # BLD AUTO: 0.71 10*3/MM3 (ref 0.1–0.9)
MONOCYTES NFR BLD AUTO: 8.4 % (ref 5–12)
NEUTROPHILS # BLD AUTO: 3.7 10*3/MM3 (ref 1.7–7)
NEUTROPHILS NFR BLD AUTO: 43.5 % (ref 42.7–76)
NRBC BLD AUTO-RTO: 0 /100 WBC (ref 0–0.2)
PLATELET # BLD AUTO: 157 10*3/MM3 (ref 140–450)
PMV BLD AUTO: 9.8 FL (ref 6–12)
POTASSIUM BLD-SCNC: 3.7 MMOL/L (ref 3.5–5.2)
PROT SERPL-MCNC: 7.4 G/DL (ref 6–8.5)
RBC # BLD AUTO: 4.88 10*6/MM3 (ref 3.77–5.28)
SODIUM BLD-SCNC: 139 MMOL/L (ref 136–145)
TROPONIN I SERPL-MCNC: 0 NG/ML (ref 0–0.05)
TROPONIN T SERPL-MCNC: <0.01 NG/ML (ref 0–0.03)
WBC NRBC COR # BLD: 8.5 10*3/MM3 (ref 3.4–10.8)
WHOLE BLOOD HOLD SPECIMEN: NORMAL
WHOLE BLOOD HOLD SPECIMEN: NORMAL

## 2020-05-19 PROCEDURE — 99284 EMERGENCY DEPT VISIT MOD MDM: CPT

## 2020-05-19 PROCEDURE — 84484 ASSAY OF TROPONIN QUANT: CPT | Performed by: EMERGENCY MEDICINE

## 2020-05-19 PROCEDURE — 85025 COMPLETE CBC W/AUTO DIFF WBC: CPT | Performed by: EMERGENCY MEDICINE

## 2020-05-19 PROCEDURE — 80053 COMPREHEN METABOLIC PANEL: CPT | Performed by: EMERGENCY MEDICINE

## 2020-05-19 PROCEDURE — 71045 X-RAY EXAM CHEST 1 VIEW: CPT

## 2020-05-19 PROCEDURE — 93005 ELECTROCARDIOGRAM TRACING: CPT | Performed by: EMERGENCY MEDICINE

## 2020-05-19 RX ORDER — SODIUM CHLORIDE 0.9 % (FLUSH) 0.9 %
10 SYRINGE (ML) INJECTION AS NEEDED
Status: DISCONTINUED | OUTPATIENT
Start: 2020-05-19 | End: 2020-05-19 | Stop reason: HOSPADM

## 2020-05-19 RX ADMIN — LIDOCAINE HYDROCHLORIDE: 20 SOLUTION ORAL; TOPICAL at 16:52

## 2020-05-19 NOTE — ED PROVIDER NOTES
Subjective   50-year-old female presents to the ED with a chief complaint of chest pain.  Patient is complaining of pinpoint left-sided chest pain that radiates through to her back.  The pain is been present for 3 days.  Describes as a sharp stabbing sensation that is intermittently present.  She planes of nausea.  Denies vomiting diarrhea or abdominal pain.  No cough shortness of breath or wheeze.  No diaphoresis.  No lightheadedness or dizziness.  Does admit to tobacco abuse.  No history of hypertension, hyperlipidemia or diabetes.  No history of coronary artery disease.  No other complaints at this time.  No prior treatments or limiting factors.          Review of Systems   Constitutional: Negative for fatigue and fever.   Respiratory: Negative for cough, shortness of breath and wheezing.    Cardiovascular: Positive for chest pain.   Gastrointestinal: Positive for nausea. Negative for diarrhea and vomiting.   All other systems reviewed and are negative.      Past Medical History:   Diagnosis Date   • Anxiety    • Arthritis     RA and OA   • Asthma    • Body piercing     ears, nose   • Diarrhea    • Elevated cholesterol     On no medication    • Epilepsy (CMS/[facility]) 03/22/2019    Reports no seizure activity in 20+ years   • Fibromyalgia    • GERD (gastroesophageal reflux disease)    • Hearing loss     Patient reports more noted in the right side but has loss in bilateral ears.  No use of hearing aids.   • History of bronchitis    • History of fracture     History of right wrist, right leg - reports no surgical intervention was required   • History of nuclear stress test 2013    Patient reports all was wnl's at that time   • History of pneumonia    • Hypothyroidism due to Hashimoto's thyroiditis    • Latex allergy    • Murmur, heart     Reports as a child    • Osteoporosis    • PONV (postoperative nausea and vomiting)    • Tattoos    • Wears glasses    • Wears partial dentures     full upper plate - instructed no  "adhesives the DOS       Allergies   Allergen Reactions   • Bee Venom Anaphylaxis   • Demerol [Meperidine] Shortness Of Breath and Itching   • Diclofenac Other (See Comments)     Reports \"it make me suicidal\"   • Flagyl [Metronidazole] Shortness Of Breath and Itching   • Latex Shortness Of Breath and Rash   • Lyrica [Pregabalin] Other (See Comments)     Reports \"it makes me suicidal\"   • Morphine Shortness Of Breath and Itching   • Saccharide Iron Complex [Iron] Shortness Of Breath     Reports \"saccharin artificial sweetner\"   • Aspirin Nausea And Vomiting   • Baclofen Other (See Comments)     Reports \"heart palpitations\"   • Dicyclomine Nausea And Vomiting       Past Surgical History:   Procedure Laterality Date   • ABDOMINAL SURGERY      diagnostic laparoscopy x2   • APPENDECTOMY     • CATARACT EXTRACTION Bilateral     cataract extractions   • COLONOSCOPY N/A 3/25/2019    Procedure: COLONOSCOPY;  Surgeon: Renata Puentes MD;  Location: [facility] ENDOSCOPY;  Service: Gastroenterology   • HYSTERECTOMY     • LAPAROSCOPIC CHOLECYSTECTOMY     • MULTIPLE TOOTH EXTRACTIONS     • OOPHORECTOMY     • TONSILLECTOMY         Family History   Problem Relation Age of Onset   • Breast cancer Paternal Grandmother    • Breast cancer Paternal Aunt    • Arthritis Mother    • Diabetes Mother    • Heart attack Mother    • Hypertension Mother    • Migraines Mother    • Osteoarthritis Mother    • Diabetes Father    • Arthritis Father    • Kidney disease Father    • Liver disease Father    • Bipolar disorder Daughter        Social History     Socioeconomic History   • Marital status:      Spouse name: Not on file   • Number of children: Not on file   • Years of education: Not on file   • Highest education level: Not on file   Tobacco Use   • Smoking status: Light Tobacco Smoker     Packs/day: 1.00     Years: 37.00     Pack years: 37.00     Types: Cigarettes     Last attempt to quit: 2019     Years since quittin.0   • " Smokeless tobacco: Never Used   • Tobacco comment: Reports she has started also using vapor in an attempt to quit smoking   Substance and Sexual Activity   • Alcohol use: No     Frequency: Never   • Drug use: No   • Sexual activity: Defer           Objective   Physical Exam   Constitutional: She is oriented to person, place, and time. She appears well-developed and well-nourished. No distress.   HENT:   Head: Normocephalic and atraumatic.   Nose: Nose normal.   Eyes: Conjunctivae and EOM are normal.   Cardiovascular: Normal rate, regular rhythm and intact distal pulses.   Pulmonary/Chest: Effort normal and breath sounds normal. No respiratory distress.   Abdominal: Soft. She exhibits no distension. There is no tenderness. There is no guarding.   Musculoskeletal: She exhibits no edema or deformity.   Neurological: She is alert and oriented to person, place, and time. No cranial nerve deficit.   Skin: She is not diaphoretic.   Nursing note and vitals reviewed.      Procedures           ED Course  ED Course as of May 20 0702   Tue May 19, 2020   1617 EKG interpreted by me.  Sinus rhythm.  Rate of 72.  No ST segment or T wave abnormalities.  Prolonged QT interval.  Abnormal EKG    [CG]      ED Course User Index  [CG] Elías Thomas, DO                                           MDM   50-year-old female with chest pain for 3 days.  EKG nonischemic.  Troponin negative.  Heart score of 2.  Further laboratory results without significant abnormality.  Chest x-ray negative for acute process.  Given the negative work-up unlikely to be ACS at this time.  She is appropriate for discharge to follow-up with cardiology.  Patient is agreeable to this plan.    Final diagnoses:   Nonspecific chest pain            Elías Thomas,   05/20/20 0701       Elías Thomas,   05/20/20 0702

## 2020-10-29 ENCOUNTER — OFFICE VISIT (OUTPATIENT)
Dept: PULMONOLOGY | Facility: CLINIC | Age: 50
End: 2020-10-29

## 2020-10-29 DIAGNOSIS — J45.40 MODERATE PERSISTENT ASTHMA WITHOUT COMPLICATION: ICD-10-CM

## 2020-10-29 DIAGNOSIS — F17.200 SMOKING: ICD-10-CM

## 2020-10-29 DIAGNOSIS — R06.02 SOB (SHORTNESS OF BREATH): Primary | ICD-10-CM

## 2020-10-29 PROCEDURE — 99442 PR PHYS/QHP TELEPHONE EVALUATION 11-20 MIN: CPT | Performed by: INTERNAL MEDICINE

## 2020-11-13 RX ORDER — MONTELUKAST SODIUM 10 MG/1
10 TABLET ORAL NIGHTLY
Qty: 90 TABLET | Refills: 1 | Status: SHIPPED | OUTPATIENT
Start: 2020-11-13

## 2020-11-13 RX ORDER — TIOTROPIUM BROMIDE INHALATION SPRAY 1.56 UG/1
2 SPRAY, METERED RESPIRATORY (INHALATION) DAILY
Qty: 3 INHALER | Refills: 1 | Status: SHIPPED | OUTPATIENT
Start: 2020-11-13

## 2020-11-13 RX ORDER — FLUTICASONE PROPIONATE 50 MCG
1 SPRAY, SUSPENSION (ML) NASAL DAILY
Qty: 3 BOTTLE | Refills: 1 | Status: SHIPPED | OUTPATIENT
Start: 2020-11-13

## 2020-11-13 RX ORDER — ALBUTEROL SULFATE 90 UG/1
2 AEROSOL, METERED RESPIRATORY (INHALATION) EVERY 4 HOURS PRN
Qty: 18 G | Refills: 5 | Status: SHIPPED | OUTPATIENT
Start: 2020-11-13

## 2025-08-22 ENCOUNTER — OFFICE VISIT (OUTPATIENT)
Facility: HOSPITAL | Age: 55
End: 2025-08-22
Payer: MEDICARE

## 2025-08-22 ENCOUNTER — SPECIALTY PHARMACY (OUTPATIENT)
Dept: NEUROLOGY | Facility: CLINIC | Age: 55
End: 2025-08-22
Payer: MEDICARE

## 2025-08-22 VITALS
HEART RATE: 76 BPM | OXYGEN SATURATION: 95 % | SYSTOLIC BLOOD PRESSURE: 108 MMHG | WEIGHT: 169.8 LBS | DIASTOLIC BLOOD PRESSURE: 78 MMHG | BODY MASS INDEX: 31.25 KG/M2 | HEIGHT: 62 IN

## 2025-08-22 DIAGNOSIS — Z87.898 HISTORY OF SEIZURE: ICD-10-CM

## 2025-08-22 DIAGNOSIS — M79.7 FIBROMYALGIA: ICD-10-CM

## 2025-08-22 DIAGNOSIS — F40.240 CLAUSTROPHOBIA: ICD-10-CM

## 2025-08-22 DIAGNOSIS — R51.9 WORSENING HEADACHES: ICD-10-CM

## 2025-08-22 DIAGNOSIS — G43.719 INTRACTABLE CHRONIC MIGRAINE WITHOUT AURA AND WITHOUT STATUS MIGRAINOSUS: Primary | ICD-10-CM

## 2025-08-22 PROBLEM — I71.02 DISSECTION OF ABDOMINAL AORTA: Status: ACTIVE | Noted: 2023-06-30

## 2025-08-22 PROBLEM — I71.00 DISSECTION OF UNSPECIFIED SITE OF AORTA: Status: ACTIVE | Noted: 2023-07-28

## 2025-08-22 PROBLEM — I35.0 NONRHEUMATIC AORTIC (VALVE) STENOSIS: Status: ACTIVE | Noted: 2023-07-28

## 2025-08-22 PROBLEM — F43.10 POSTTRAUMATIC STRESS DISORDER: Status: ACTIVE | Noted: 2025-08-22

## 2025-08-22 PROBLEM — F33.1 MODERATE RECURRENT MAJOR DEPRESSION: Status: ACTIVE | Noted: 2019-10-31

## 2025-08-22 PROBLEM — M05.79 SEROPOSITIVE RHEUMATOID ARTHRITIS OF MULTIPLE SITES: Status: ACTIVE | Noted: 2019-10-31

## 2025-08-22 PROBLEM — E66.9 OBESITY, UNSPECIFIED: Status: ACTIVE | Noted: 2023-06-30

## 2025-08-22 PROBLEM — G47.9 SLEEP DISORDER: Status: ACTIVE | Noted: 2025-08-22

## 2025-08-22 PROBLEM — F41.1 GENERALIZED ANXIETY DISORDER: Status: ACTIVE | Noted: 2025-08-22

## 2025-08-22 PROBLEM — F41.0 PANIC DISORDER WITHOUT AGORAPHOBIA: Status: ACTIVE | Noted: 2025-08-22

## 2025-08-22 PROBLEM — I73.9 PERIPHERAL VASCULAR DISEASE, UNSPECIFIED: Status: ACTIVE | Noted: 2023-05-23

## 2025-08-22 PROBLEM — I70.0 HARDENING OF THE AORTA (MAIN ARTERY OF THE HEART): Status: ACTIVE | Noted: 2023-07-27

## 2025-08-22 PROBLEM — Q25.1 COARCTATION OF AORTA: Status: ACTIVE | Noted: 2023-07-27

## 2025-08-22 PROCEDURE — G0463 HOSPITAL OUTPT CLINIC VISIT: HCPCS | Performed by: NURSE PRACTITIONER

## 2025-08-22 RX ORDER — SARILUMAB 150 MG/1.14ML
150 INJECTION, SOLUTION SUBCUTANEOUS
COMMUNITY
Start: 2025-07-23

## 2025-08-22 RX ORDER — HYDROXYZINE HYDROCHLORIDE 25 MG/1
25 TABLET, FILM COATED ORAL EVERY 8 HOURS PRN
COMMUNITY
Start: 2025-07-18

## 2025-08-22 RX ORDER — CLOPIDOGREL BISULFATE 75 MG/1
75 TABLET ORAL DAILY
COMMUNITY

## 2025-08-22 RX ORDER — DIAZEPAM 5 MG/1
TABLET ORAL
Qty: 2 TABLET | Refills: 0 | Status: SHIPPED | OUTPATIENT
Start: 2025-08-22

## 2025-08-22 RX ORDER — DESVENLAFAXINE 50 MG/1
50 TABLET, FILM COATED, EXTENDED RELEASE ORAL DAILY
COMMUNITY
Start: 2025-07-18

## 2025-08-22 RX ORDER — ATORVASTATIN CALCIUM 20 MG/1
20 TABLET, FILM COATED ORAL DAILY
COMMUNITY

## 2025-08-22 RX ORDER — PROPRANOLOL HCL 20 MG
20 TABLET ORAL 2 TIMES DAILY
COMMUNITY
Start: 2025-07-18

## 2025-08-22 RX ORDER — RIZATRIPTAN BENZOATE 10 MG/1
10 TABLET ORAL ONCE AS NEEDED
COMMUNITY
Start: 2025-06-19

## 2025-08-22 RX ORDER — ALBUTEROL SULFATE 90 UG/1
2 INHALANT RESPIRATORY (INHALATION)
COMMUNITY

## 2025-08-22 RX ORDER — PRAZOSIN HYDROCHLORIDE 1 MG/1
1 CAPSULE ORAL NIGHTLY
COMMUNITY
Start: 2025-07-18

## 2025-08-22 RX ORDER — PANTOPRAZOLE SODIUM 40 MG/1
40 TABLET, DELAYED RELEASE ORAL DAILY
COMMUNITY
Start: 2025-07-18

## 2025-08-25 ENCOUNTER — SPECIALTY PHARMACY (OUTPATIENT)
Dept: ONCOLOGY | Facility: HOSPITAL | Age: 55
End: 2025-08-25
Payer: MEDICARE

## 2025-08-25 RX ORDER — GALCANEZUMAB 120 MG/ML
120 INJECTION, SOLUTION SUBCUTANEOUS
Qty: 1 ML | Refills: 10 | Status: SHIPPED | OUTPATIENT
Start: 2025-09-16

## (undated) DEVICE — MEDI-VAC NON-CONDUCTIVE SUCTION TUBING: Brand: CARDINAL HEALTH

## (undated) DEVICE — KT ORCA VLV SXN AIR/H2O W/SEAL 1P/U STRL

## (undated) DEVICE — JELLY,LUBE,STERILE,FLIP TOP,TUBE,2-OZ: Brand: MEDLINE

## (undated) DEVICE — GLV SURG SENSICARE W/ALOE PF LF 6.5 STRL

## (undated) DEVICE — YANKAUER,BULB TIP, NO VENT: Brand: ARGYLE

## (undated) DEVICE — GLV SURG SENSICARE W/ALOE PF LF SZ6 STRL

## (undated) DEVICE — Device